# Patient Record
Sex: FEMALE | Race: WHITE | URBAN - METROPOLITAN AREA
[De-identification: names, ages, dates, MRNs, and addresses within clinical notes are randomized per-mention and may not be internally consistent; named-entity substitution may affect disease eponyms.]

---

## 2021-11-08 ENCOUNTER — OFFICE VISIT (OUTPATIENT)
Dept: OBGYN CLINIC | Facility: CLINIC | Age: 56
End: 2021-11-08
Payer: COMMERCIAL

## 2021-11-08 VITALS
DIASTOLIC BLOOD PRESSURE: 78 MMHG | WEIGHT: 168 LBS | BODY MASS INDEX: 32.98 KG/M2 | HEIGHT: 60 IN | SYSTOLIC BLOOD PRESSURE: 120 MMHG

## 2021-11-08 DIAGNOSIS — Z01.419 ENCOUNTER FOR GYNECOLOGICAL EXAMINATION WITHOUT ABNORMAL FINDING: Primary | ICD-10-CM

## 2021-11-08 PROCEDURE — 99386 PREV VISIT NEW AGE 40-64: CPT | Performed by: OBSTETRICS & GYNECOLOGY

## 2021-11-08 PROCEDURE — 3074F SYST BP LT 130 MM HG: CPT | Performed by: OBSTETRICS & GYNECOLOGY

## 2021-11-08 PROCEDURE — 3078F DIAST BP <80 MM HG: CPT | Performed by: OBSTETRICS & GYNECOLOGY

## 2021-11-08 PROCEDURE — 88175 CYTOPATH C/V AUTO FLUID REDO: CPT | Performed by: OBSTETRICS & GYNECOLOGY

## 2021-11-08 PROCEDURE — 87624 HPV HI-RISK TYP POOLED RSLT: CPT | Performed by: OBSTETRICS & GYNECOLOGY

## 2021-11-08 PROCEDURE — 3008F BODY MASS INDEX DOCD: CPT | Performed by: OBSTETRICS & GYNECOLOGY

## 2021-11-08 RX ORDER — MULTIVITAMIN
1 TABLET ORAL DAILY
COMMUNITY

## 2021-11-08 NOTE — PROGRESS NOTES
GYN H&P     2021  9:13 AM    CC: Patient is here for annual. LPS 6 years ago    HPI: Patient is a 55year old  for as above. No concerns. She is sexually active without pain. No problems with hot flashes. Plans to do cologard.     She is cur tobacco: Never Used    Vaping Use      Vaping Use: Never used    Substance and Sexual Activity      Alcohol use: Yes        Comment: Rarely      Drug use: Never      Sexual activity: Yes        Partners: Male    Social History    Social History Narrative

## 2021-11-12 NOTE — PROGRESS NOTES
Pt called back normal results given and pt would like normal pap letter mailed to her home. Pt voiced understanding of normal results and letter mailed out on 11/12/2021.

## (undated) NOTE — LETTER
2021      Ramos Cancino  : 1965    Re: Test Results     Thank you for your recent visit to our office. We have received your test results, which were done on _2021___________________.     PAP Test: Your results are normal.    [facility] Timothy Duarte

## (undated) NOTE — MR AVS SNAPSHOT
After Visit Summary   11/8/2021    Larrylamonte Sheets   MRN: [id number]           Visit Information     Date & Time  11/8/2021  8:30 AM Provider  Cristina Olivarez MD [address] [facility] Dept.  Cristela need to use this code after you have completed the sign-up process. If you do not sign up before the expiration date, you must request a new code. [employer] Activation Code: [email or web address]  Expires: 11/21/2021 12:25 PM    4.  Enter your Zip Code and Date of B [email or web address]. "[email or web address]/[email or web address]         No text in SmartText       No text in SmartText